# Patient Record
Sex: FEMALE | Race: WHITE
[De-identification: names, ages, dates, MRNs, and addresses within clinical notes are randomized per-mention and may not be internally consistent; named-entity substitution may affect disease eponyms.]

---

## 2018-03-10 ENCOUNTER — HOSPITAL ENCOUNTER (EMERGENCY)
Dept: HOSPITAL 56 - MW.ED | Age: 18
Discharge: HOME | End: 2018-03-10
Payer: COMMERCIAL

## 2018-03-10 DIAGNOSIS — J34.3: Primary | ICD-10-CM

## 2018-03-10 NOTE — EDM.PDOC
ED HPI GENERAL MEDICAL PROBLEM





- General


Chief Complaint: General


Stated Complaint: DIZZINESS AND BLOODING NOSE


Time Seen by Provider: 03/10/18 13:43


Source of Information: Reports: Patient


History Limitations: Reports: No Limitations





- History of Present Illness


INITIAL COMMENTS - FREE TEXT/NARRATIVE: 





HISTORY AND PHYSICAL:


[]18-year-old female presenting with some dizziness also bloody nose





History of Present Illness:


[]Patient has had dizziness off and on for the last few days when she stands 

up. bloody nose today


A coworker who is an RN in the [employer] told her she should come in and have her 

blood pressure checked.





Review of Systems:


As per history of present illness and below otherwise all 


systems reviewed and negative.  





Past medical history:


As per history of present illness and as reviewed below


otherwise noncontributory.





Surgical history:


As per history of present illness and as reviewed below


otherwise noncontributory.





Social history:


No reported history of drug or alcohol abuse.





Family history:


As per history of present illness and as reviewed below


otherwise noncontributory.





Physical exam:


Alert and oriented female answering questions appropriately in full sentences 

she has good eye contact. Answering questions in full sentences without any 

shortness of breath.


HEENT: Atraumatic, normocehpalic, pupils reactive, negative for conjunctival 

pallor or scleral icterus, mucous membranes moist, throat clear, neck supple, 

nontender, trachea midline.  Nasal turbinates are quite erythematous and 

inflamed. No emerson bleeding noted no clots present.


Lungs: Crackles on auscultation left base was posteriorly, breath sounds equal 

bilaterally, chest non tender.  


Heart: S1S2, regular, negative for clicks, rubs, or JVD.


Abdomen: Soft, nondistended, nontender.  Negative for masses or 

hepatossplenmegaly. Negative for costovertebral tenderness.


Pelvis: Stable nontender.


Genitourinary: Deferred.


Rectal: Deferred


Extremities: Atraumatic, negative for cords or calf pain.  


Neurovascular unremarkable.


Neuro:  Awake, alert, oriented.  Cranial nerves II through XII


unremarkable.  Cerebellum unremarkable.  Motor and sensory unremarkable 

throughout.  Exam nonfocal.  


Patient refuses chest x-ray as she is on her way to [place] and will get one 

there.


Blood pressure is not elevated





Diagnostics:


[]





Therapeutics:


[]





Impression:


[Inflamed nasal turbinates


]





Plan:


[Discharged home 


Symptomatic cares reviewed for improving her comfort level with the nasal 

turbinates


Follow-up with your primary care next week





Definitive disposition and diagnosis as appropriate pending


reevaluation and review of above.  





Onset: Today, Sudden


Duration: Hour(s):


Quality: Reports: Ache


Severity: Mild


Improves with: Reports: None


Worsens with: Reports: None





- Related Data


 Allergies











Allergy/AdvReac Type Severity Reaction Status Date / Time


 


No Known Allergies Allergy   Verified 03/10/18 13:12











Home Meds: 


 Home Meds





. [No Known Home Meds]  03/10/18 [History]


Birth Control  03/10/18 [History]











Past Medical History


Respiratory History: Reports: Other (See Below)


Other Respiratory History: exercise induced asthma


Musculoskeletal History: Reports: Other (See Below)


Other Musculoskeletal History: scoliosis





Social & Family History





- Family History


Family Medical History: Noncontributory





- Tobacco Use


Smoking Status *Q: Never Smoker





- Recreational Drug Use


Recreational Drug Use: No





ED ROS GENERAL





- Review of Systems


Review Of Systems: ROS reveals no pertinent complaints other than HPI.





ED EXAM, GENERAL





- Physical Exam


Exam: See Below (See dictation)





Course





- Vital Signs


Last Recorded V/S: 


 Last Vital Signs











Temp  36.6 C   03/10/18 13:09


 


Pulse  89   03/10/18 13:09


 


Resp  18   03/10/18 13:09


 


BP  145/80 H  03/10/18 13:09


 


Pulse Ox  96   03/10/18 13:09














Departure





- Departure


Time of Disposition: 13:51


Disposition: Home, Self-Care 01


Condition: Good


Clinical Impression: 


 Nasal turbinate hypertrophy








- Discharge Information


Referrals: 


PCP,None [Primary Care Provider] - 


Forms:  ED Department Discharge


Additional Instructions: 


The following information is given to patients seen in the emergency department 

who are being discharged to home. This information is to outline your options 

for follow-up care. We provide all patients seen in our emergency department 

with a follow-up referral.





The need for follow-up, as well as the timing and circumstances, are variable 

depending upon the specifics of your emergency department visit.





If you don't have a primary care physician on staff, we will provide you with a 

referral. We always advise you to contact your personal physician following an 

emergency department visit to inform them of the circumstance of the visit and 

for follow-up with them and/or the need for any referrals to a consulting 

specialist.





The emergency department will also refer you to a specialist when appropriate. 

This referral assures that you have the opportunity for followup care with a 

specialist. All of these measure are taken in an effort to provide you with 

optimal care, which includes your followup.





Under all circumstances we always encourage you to contact your private 

physician who remains a resource for coordinating  your care. When calling for 

followup care, please make the office aware that this follow-up is from your 

recent emergency room visit. If for any reason you are refused follow-up, 

please contact the [facility] emergency department at [phone] 

and asked to speak to the emergency department charge nurse.





He was found to have inflamed nasal turbinates while in the emergency room


Use a combination of Neosporin and fixed to your nose twice a day


Follow-up with your physician in [place] as anticipated

## 2021-09-09 NOTE — EDM.PDOC
ED HPI GENERAL MEDICAL PROBLEM





- General


Chief Complaint: Respiratory Problem


Stated Complaint: BRONCHITIS,SINUS


Time Seen by Provider: 09/09/21 12:39


Source of Information: Reports: Patient


History Limitations: Reports: No Limitations





- History of Present Illness


INITIAL COMMENTS - FREE TEXT/NARRATIVE: 


HISTORY AND PHYSICAL:





History of present illness:


Patient is a 21-year-old female who presents to the emergency room with 

complaints of sinus pressure, cough, subjective fevers over the past 4 days.  

She was seen at the walk-in clinic and was given a prescription for azithromycin

for bronchitis, states she does not feel any improvement.  She still is taking 

the antibiotic.  Patient denies any fever, chills, headache, change in vision, 

syncope or near syncope. Denies any chest pain, back pain, shortness of breath 

or cough. Denies any abdominal pain, nausea, vomiting, diarrhea, constipation or

dysuria. Has not noted any blood in urine or stool. Patient has been eating and 

drinking appropriately.





Review of systems: 


As per history of present illness and below otherwise all systems reviewed and 

negative.





Past medical history: 


As per history of present illness and as reviewed below otherwise 

noncontributory.





Surgical history: 


As per history of present illness and as reviewed below otherwise 

noncontributory.





Social history: 


See social history for further information





Family history: 


As per history of present illness and as reviewed below otherwise 

noncontributory.





Physical exam:


General: Well developed and well nourished. Alert and orientated x 3. Nontoxic 

in appearance and in no acute distress. Vital signs are stable and have been 

reviewed by me. Nursing notes were reviewed. 


HEENT: Atraumatic, normocephalic, pupils equal and reactive bilaterally, 

negative for conjunctival pallor or scleral icterus, mucous membranes moist, TMs

normal bilaterally, throat clear without exudate or pillar shifting, neck 

supple, nontender, trachea midline. No drooling or trismus noted. No meningeal 

signs. No hot potato voice noted. 


Lungs: Clear to auscultation bilaterally. No wheezes, rales, or rhonchi. Chest 

nontender. Normal work of breathing, no accessory muscles used.


Heart: S1S2, regular rate and rhythm without overt murmur, gallops, or rubs. No 

JVD. No peripheral edema


Abdomen: Soft, nondistended, nontender. Normoactive bowel sounds. Negative for 

masses or costovertebral tenderness.


Skin: Intact, warm, dry. No lesions or rashes noted.


Hematologic: No petechiae or purpra. Mucosa appropriate color and normal nail 

bed color and refill.


Extremities: Atraumatic, moves all extremities per self without difficulty or 

deficits, negative for cords or calf pain. Neurovascular unremarkable.


Neuro: Awake, alert, oriented. Cranial nerves II through XII unremarkable. 

Cerebellum unremarkable. Motor and sensory unremarkable throughout. Exam 

nonfocal.


Psychiatric: Mood and affect are appropriate.  Normal thought process. Answering

questions appropriately.





Please note that the patient was seen and evaluated during the 2020 SARS-CoV-2 

novel coronavirus pandemic period.  Community viral transmission is ongoing at 

time of this encounter and the emergency department is operating under pandemic 

response procedures.





Medical Decision Making:


Patient is a 21-year-old female who presents to the emergency room with 

complaints of upper respiratory symptoms.  She states she is concerned she has 

COVID-19.  Was put on antibiotics and given an inhaler although does not feel 

any improvement.





Chest x-ray is unremarkable.  I have talked with the patient about today's 

findings, in addition to providing specific details for plan of care.  

Reassessment at the time of disposition demonstrates that the patient is in no 

acute distress.  The patient is stable for discharge, counseling was provided 

and we discussed in great detail signs and symptoms that would prompt them to 

return to the Emergency Department. Medication, follow up and supportive care 

measures were reviewed and discussed. Voices understanding and is agreeable to 

plan of care. Denies any further questions or concerns at this time.





Diagnostics:


Influenza/COVID-19, chest x-ray





Therapeutics:


None





Prescription:


Prednisone





Impression: 


URI





Plan:


1.  You were evaluated today on an emergent basis. Your chest x-ray is 

unremarkable. COVID and influenza are negative. Continue taking your medication 

as directed. 


2.  You can alternate Tylenol and ibuprofen as needed for pain and fever 

management.


3. We encourage you to follow up with your primary care provider and/or 

recommended specialist in the next few days for re-evaluation and further 

care/management. 


4. If your symptoms should worsen, new symptoms develop or any of the signs and 

symptoms we discussed should arise please return to the emergency room or call 

[phone] (if needed).





Definitive disposition and diagnosis as appropriate pending reevaluation and 

review of above.








- Related Data


                                    Allergies











Allergy/AdvReac Type Severity Reaction Status Date / Time


 


No Known Allergies Allergy   Verified 08/08/18 23:25











Home Meds: 


                                    Home Meds





Birth Control 1 tab PO DAILY 03/10/18 [History]











Past Medical History





- Past Health History


Medical/Surgical History: Denies Medical/Surgical History


Respiratory History: Reports: Other (See Below)


Other Respiratory History: exercise induced asthma


Musculoskeletal History: Reports: Other (See Below)


Other Musculoskeletal History: scoliosis





Social & Family History





- Family History


Family Medical History: No Pertinent Family History





- Caffeine Use


Caffeine Use: Reports: Coffee





ED ROS GENERAL





- Review of Systems


Review Of Systems: Comprehensive ROS is negative, except as noted in HPI.





ED EXAM, GENERAL





- Physical Exam


Exam: See Below (See dictation)





Course





- Vital Signs


Last Recorded V/S: 


                                Last Vital Signs











Temp  98 F   09/09/21 12:54


 


Pulse  97   09/09/21 12:54


 


Resp  16   09/09/21 12:54


 


BP  125/86   09/09/21 12:54


 


Pulse Ox  98   09/09/21 12:54














- Orders/Labs/Meds


Orders: 


                               Active Orders 24 hr











 Category Date Time Status


 


 Isolation [COMM] Routine Oth  09/09/21 12:56 Active











Labs: 


                                Laboratory Tests











  09/09/21 Range/Units





  13:05 


 


SARS-CoV-2 RNA (JD)  NEGATIVE  (NEGATIVE)  














Departure





- Departure


Time of Disposition: 14:29


Disposition: Home, Self-Care 01


Clinical Impression: 


URI (upper respiratory infection)


Qualifiers:


 URI type: unspecified URI Qualified Code(s): J06.9 - Acute upper respiratory 

infection, unspecified








- Discharge Information


Instructions:  Upper Respiratory Infection, Adult, Easy-to-Read


Referrals: 


Bhavya Huertas NP [Primary Care Provider] - 


Forms:  ED Department Discharge


Additional Instructions: 


The following information is given to patients seen in the emergency department 

who are being discharged to home. This information is to outline your options 

for follow-up care. We provide all patients seen in our emergency department 

with a follow-up referral.





The need for follow-up, as well as the timing and circumstances, are variable 

depending upon the specifics of your emergency department visit.





If you don't have a primary care physician on staff, we will provide you with a 

referral. We always advise you to contact your personal physician following an 

emergency department visit to inform them of the circumstance of the visit and 

for follow-up with them and/or the need for any referrals to a consulting 

specialist.





The emergency department will also refer you to a specialist when appropriate. 

This referral assures that you have the opportunity for follow-up care with a 

specialist. All of these measure are taken in an effort to provide you with 

optimal care, which includes your follow-up.





Under all circumstances we always encourage you to contact your private 

physician who remains a resource for coordinating your care. When calling for 

follow-up care, please make the office aware that this follow-up is from your 

recent emergency room visit. If for any reason you are refused follow-up, please

contact the  Emergency Department

at [phone] and asked to speak to the emergency department charge nurse.








Primary Care


[address] [place] [id number]


Phone: [phone]


Fax: [phone]





[place] [address]


Phone: [phone]


Fax: [phone]





Thank you for choosing the [facility] emergency department in [facility] for your medical needs today.  It was a pleasure caring for you. Today 

you were seen in the emergency department for upper respiratory illness





1.  You were evaluated today on an emergent basis. Your chest x-ray is 

unremarkable. COVID and influenza are negative. Continue taking your medication 

as directed. 


2.  You can alternate Tylenol and ibuprofen as needed for pain and fever 

management.


3. We encourage you to follow up with your primary care provider and/or 

recommended specialist in the next few days for re-evaluation and further 

care/management. 


4. If your symptoms should worsen, new symptoms develop or any of the signs and 

symptoms we discussed should arise please return to the emergency room or call 

911 (if needed).





Sepsis Event Note (ED)





- Focused Exam


Vital Signs: 


                                   Vital Signs











  Temp Pulse Resp BP Pulse Ox


 


 09/09/21 12:54  98 F  97  16  125/86  98














- My Orders


Last 24 Hours: 


My Active Orders





09/09/21 12:56


Isolation [COMM] Routine 














- Assessment/Plan


Last 24 Hours: 


My Active Orders





09/09/21 12:56


Isolation [COMM] Routine

## 2021-09-09 NOTE — CR
INDICATION:



Chest pain and SOB.



TECHNIQUE:



Portable AP image of the chest.



COMPARISON:



None.



FINDINGS:



Lungs and pleural spaces clear. Heart, mediastinum and pulmonary vessels 

normal. No significant osseous abnormality.



IMPRESSION:



Negative chest.



Dictated by Jose C Wolfe MD @ 9/9/2021 1:46:08 PM



(Electronically Signed)